# Patient Record
Sex: MALE | Race: WHITE
[De-identification: names, ages, dates, MRNs, and addresses within clinical notes are randomized per-mention and may not be internally consistent; named-entity substitution may affect disease eponyms.]

---

## 2019-07-08 ENCOUNTER — HOSPITAL ENCOUNTER (OUTPATIENT)
Age: 59
End: 2019-07-08
Payer: COMMERCIAL

## 2019-07-08 DIAGNOSIS — R10.9: Primary | ICD-10-CM

## 2019-07-08 LAB
ADD MANUAL DIFF / SLIDE REVIEW: NO
ALBUMIN SERPL-MCNC: 4.6 G/DL (ref 3.5–5)
ALBUMIN/GLOB SERPL: 1.6 {RATIO} (ref 1–2.8)
ALP SERPL-CCNC: 78 U/L (ref 38–126)
ALT SERPL-CCNC: 36 IU/L (ref 21–72)
BUN SERPL-MCNC: 24 MG/DL (ref 9–20)
CALCIUM SERPL-MCNC: 9.6 MG/DL (ref 8.4–10.2)
CHLORIDE SERPL-SCNC: 104 MMOL/L (ref 98–107)
CO2 SERPL-SCNC: 27 MMOL/L (ref 22–32)
ESTIMATED GLOMERULAR FILT RATE: > 60 ML/MIN (ref 60–?)
GLOBULIN SER CALC-MCNC: 2.9 G/DL (ref 1.7–4.1)
GLUCOSE SERPL-MCNC: 99 MG/DL (ref 70–100)
HEMATOCRIT: 42.3 % (ref 41–53)
HEMOGLOBIN: 14.4 G/DL (ref 13.5–17.5)
HEMOLYSIS: < 15 (ref 0–50)
LIPASE SERPL-CCNC: 268 U/L (ref 23–300)
LYMPHOCYTES # SPEC AUTO: 1200 /UL (ref 1100–4500)
MCV RBC: 89.2 FL (ref 80–100)
MEAN CORPUSCULAR HEMOGLOBIN: 30.3 PG (ref 26–34)
MEAN CORPUSCULAR HGB CONC: 34 % (ref 30–36)
PLATELET COUNT: 178 X10^3/UL (ref 150–400)
POTASSIUM SERPL-SCNC: 5 MMOL/L (ref 3.4–5.1)
PROT SERPL-MCNC: 7.5 G/DL (ref 6.3–8.2)
SODIUM SERPL-SCNC: 139 MMOL/L (ref 137–145)

## 2019-07-08 PROCEDURE — 86140 C-REACTIVE PROTEIN: CPT

## 2019-07-08 PROCEDURE — 85651 RBC SED RATE NONAUTOMATED: CPT

## 2019-07-08 PROCEDURE — 36415 COLL VENOUS BLD VENIPUNCTURE: CPT

## 2019-07-08 PROCEDURE — 85025 COMPLETE CBC W/AUTO DIFF WBC: CPT

## 2019-07-08 PROCEDURE — 74022 RADEX COMPL AQT ABD SERIES: CPT

## 2019-07-08 PROCEDURE — 83690 ASSAY OF LIPASE: CPT

## 2019-07-08 PROCEDURE — 80053 COMPREHEN METABOLIC PANEL: CPT

## 2019-07-08 NOTE — DI.RAD.S_ITS
PROCEDURE:  XR ACUTE ABDOMEN SERIES  
   
INDICATIONS:  abdominal pain  
   
TECHNIQUE:  One view chest and two views of the abdomen were acquired.    
   
COMPARISON:  None.  
   
FINDINGS:    
   
Surgical changes and devices:  None.    
   
Chest:  Lungs are clear.  Heart size is normal.  No pleural effusions.  No   
pneumoperitoneum.    
   
Abdomen:  Bowel gas pattern is normal.  Mild fecal stasis throughout the colon is seen.   
No suspicious calcifications.  Visualized solid organ contours appear normal.    
   
Bones:  No suspicious bony lesions.    
   
IMPRESSION: Mild constipation. No gross free air. No acute cardiopulmonary pathology.  
   
   
   
Dictated by: Bebeto Kothrai M.D. on 7/08/2019 at 17:05       
Approved by: Bebeto Kothari M.D. on 7/08/2019 at 17:05

## 2020-03-02 ENCOUNTER — HOSPITAL ENCOUNTER (OUTPATIENT)
Dept: HOSPITAL 73 - ED | Age: 60
Setting detail: OBSERVATION
Discharge: HOME | End: 2020-03-02
Payer: COMMERCIAL

## 2020-03-02 VITALS
DIASTOLIC BLOOD PRESSURE: 64 MMHG | SYSTOLIC BLOOD PRESSURE: 123 MMHG | OXYGEN SATURATION: 98 % | HEART RATE: 56 BPM | RESPIRATION RATE: 17 BRPM

## 2020-03-02 VITALS
RESPIRATION RATE: 18 BRPM | TEMPERATURE: 98 F | SYSTOLIC BLOOD PRESSURE: 181 MMHG | HEART RATE: 84 BPM | DIASTOLIC BLOOD PRESSURE: 98 MMHG | OXYGEN SATURATION: 98 %

## 2020-03-02 VITALS
DIASTOLIC BLOOD PRESSURE: 112 MMHG | OXYGEN SATURATION: 95 % | SYSTOLIC BLOOD PRESSURE: 159 MMHG | TEMPERATURE: 97.8 F | HEART RATE: 73 BPM | RESPIRATION RATE: 16 BRPM

## 2020-03-02 VITALS
TEMPERATURE: 97.52 F | OXYGEN SATURATION: 98 % | HEART RATE: 61 BPM | RESPIRATION RATE: 17 BRPM | DIASTOLIC BLOOD PRESSURE: 66 MMHG | SYSTOLIC BLOOD PRESSURE: 143 MMHG

## 2020-03-02 VITALS
RESPIRATION RATE: 16 BRPM | DIASTOLIC BLOOD PRESSURE: 76 MMHG | SYSTOLIC BLOOD PRESSURE: 153 MMHG | HEART RATE: 56 BPM | OXYGEN SATURATION: 98 %

## 2020-03-02 VITALS — BODY MASS INDEX: 29.5 KG/M2

## 2020-03-02 VITALS
HEART RATE: 59 BPM | DIASTOLIC BLOOD PRESSURE: 77 MMHG | RESPIRATION RATE: 16 BRPM | OXYGEN SATURATION: 97 % | SYSTOLIC BLOOD PRESSURE: 138 MMHG | TEMPERATURE: 98.2 F

## 2020-03-02 DIAGNOSIS — I10: ICD-10-CM

## 2020-03-02 DIAGNOSIS — G45.9: Primary | ICD-10-CM

## 2020-03-02 DIAGNOSIS — R20.0: ICD-10-CM

## 2020-03-02 DIAGNOSIS — G47.30: ICD-10-CM

## 2020-03-02 LAB
ADD MANUAL DIFF / SLIDE REVIEW: NO
BUN SERPL-MCNC: 21 MG/DL (ref 9–20)
CALCIUM SERPL-MCNC: 9.3 MG/DL (ref 8.4–10.2)
CHLORIDE SERPL-SCNC: 106 MMOL/L (ref 98–107)
CO2 SERPL-SCNC: 26 MMOL/L (ref 22–32)
ESTIMATED GLOMERULAR FILT RATE: > 60 ML/MIN (ref 60–?)
GLUCOSE SERPL-MCNC: 98 MG/DL (ref 80–110)
HEMATOCRIT: 40.2 % (ref 41–53)
HEMOGLOBIN: 14 G/DL (ref 13.5–17.5)
HEMOLYSIS: < 15 (ref 0–50)
INR PPP: 0.9 (ref 0.9–1.3)
LYMPHOCYTES # SPEC AUTO: 1400 /UL (ref 1100–4500)
MCV RBC: 86.2 FL (ref 80–100)
MEAN CORPUSCULAR HEMOGLOBIN: 30.1 PG (ref 26–34)
MEAN CORPUSCULAR HGB CONC: 34.9 % (ref 30–36)
PLATELET COUNT: 141 X10^3/UL (ref 150–400)
POTASSIUM SERPL-SCNC: 3.8 MMOL/L (ref 3.4–5.1)
PROTHROMBIN TIME: 10.9 SECONDS (ref 10.1–12.7)
PTT PARTIAL THROMBOPLASTIN TIM: 27 SECONDS (ref 26.4–36.2)
SODIUM SERPL-SCNC: 141 MMOL/L (ref 137–145)

## 2020-03-02 PROCEDURE — 93010 ELECTROCARDIOGRAM REPORT: CPT

## 2020-03-02 PROCEDURE — 99285 EMERGENCY DEPT VISIT HI MDM: CPT

## 2020-03-02 PROCEDURE — 70450 CT HEAD/BRAIN W/O DYE: CPT

## 2020-03-02 PROCEDURE — 85610 PROTHROMBIN TIME: CPT

## 2020-03-02 PROCEDURE — G0378 HOSPITAL OBSERVATION PER HR: HCPCS

## 2020-03-02 PROCEDURE — 93306 TTE W/DOPPLER COMPLETE: CPT

## 2020-03-02 PROCEDURE — 85730 THROMBOPLASTIN TIME PARTIAL: CPT

## 2020-03-02 PROCEDURE — 70548 MR ANGIOGRAPHY NECK W/DYE: CPT

## 2020-03-02 PROCEDURE — 93005 ELECTROCARDIOGRAM TRACING: CPT

## 2020-03-02 PROCEDURE — 96361 HYDRATE IV INFUSION ADD-ON: CPT

## 2020-03-02 PROCEDURE — 85025 COMPLETE CBC W/AUTO DIFF WBC: CPT

## 2020-03-02 PROCEDURE — 80048 BASIC METABOLIC PNL TOTAL CA: CPT

## 2020-03-02 PROCEDURE — A9579 GAD-BASE MR CONTRAST NOS,1ML: HCPCS

## 2020-03-02 PROCEDURE — 99235 HOSP IP/OBS SAME DATE MOD 70: CPT

## 2020-03-02 PROCEDURE — 96360 HYDRATION IV INFUSION INIT: CPT

## 2020-03-02 PROCEDURE — 36415 COLL VENOUS BLD VENIPUNCTURE: CPT

## 2020-03-02 PROCEDURE — 70553 MRI BRAIN STEM W/O & W/DYE: CPT

## 2020-03-02 PROCEDURE — 96372 THER/PROPH/DIAG INJ SC/IM: CPT

## 2020-03-02 RX ADMIN — ASPIRIN 324 MG: 81 TABLET, CHEWABLE ORAL at 06:28

## 2020-03-02 RX ADMIN — ENOXAPARIN SODIUM 40 MG: 40 INJECTION SUBCUTANEOUS at 09:35

## 2020-03-02 RX ADMIN — SODIUM CHLORIDE 150 ML: 0.9 INJECTION, SOLUTION INTRAVENOUS at 06:27

## 2020-03-02 NOTE — ED.NEUROSD
"HPI - Neuro Symptoms/Deficit
General
Chief Complaint: Neuro Symptoms/Deficit
Stated Complaint: right side numbness from neck through armm
Time Seen by Provider: 20 05:05
Source: patient
Mode of arrival: Ambulatory
Limitations: no limitations
History of Present Illness
HPI Narrative: 59-year-old male nonsmoker with history of hypertension presents with a chief complaint right arm numbness tingling and heaviness since  this morning.  He went to bed at about 5:00 p.m. and was in his normal state of health and 
woke up feeling this way, he thought that maybe he slept on wrong that it would just wear off.  He denies any blurred vision or trouble with speech but does state that he had some balance issues earlier.  He denies any history of the same.  He has 
had no headache and denies any traumatic injuries.
Onset (ago): hour(s)
Location: right arm
History of same: No
Severity: moderate
Quality: weak, numb and tingling
Relieving factors: none
Exacerbating factors: none
On Anticoagulants: No
Associated symptoms: denies other symptoms
Treatments Prior to Arrival: none
Related Data
Home Medications

 Medication  Instructions  Recorded  Confirmed
ibuprofen 200 mg PO Q6HP #0 01/25/13 10/24/19
MULTIVITAMIN 1 cap PO QDAY #0 02/21/13 10/24/19

Previous Rx's

 Medication  Instructions  Recorded
prednisone 20 mg tablet See Rx Instructions PO .COMPLEX #8 10/24/19
 tab 


Allergies

Allergy/AdvReac Type Severity Reaction Status Date / Time
No Known Allergies Allergy   Verified 20 05:22



Review of Systems
Constitutional
Constitutional: Denies chills, Denies fatigue, Denies fever(s), Denies frequent falls, Denies lethargy and Reports weakness
Eyes
Eyes: Denies change in vision, Denies eye discharge, Denies irritation and Denies loss of vision
ENT
Ears, Nose, Mouth, and Throat: Denies change in voice, Denies dizziness, Denies neck pain, Denies sore throat and Denies throat swelling
Cardiovascular
Cardiovascular: Denies chest pain, Denies irregular heart rhythm, Denies lightheadedness, Denies palpitations, Denies dyspnea, Denies dyspnea on exertion and Denies orthopnea
Respiratory
Respiratory: Denies cough, Denies dyspnea, Denies dyspnea on exertion and Denies wheezing
Gastrointestinal
Gastrointestinal: Denies abdominal pain, Denies change in bowel habits, Denies diarrhea, Denies nausea and Denies vomiting
Genitourinary
Genitourinary: Denies hematuria, Denies flank pain, Denies urinary incontinence and Denies urinary urgency
Musculoskeletal
Musculoskeletal: Denies back pain, Denies muscle weakness, Denies neck pain, Reports numbness and Denies tingling
Integumentary/Breasts
Skin/Breast: Denies pruritus, Denies erythema, Denies rash and Denies wounds
Neurologic
Neurologic: Denies behavioral changes, Denies confusion, Denies dizziness, Denies frequent falls, Denies loss of vision, Reports numbness, Denies tingling and Reports weakness
Psychiatric
Psychiatric: Denies anxiety, Denies behavioral changes, Denies confusion, Denies depression, Denies homicidal ideation and Denies suicidal ideation
Endocrine
Endocrine: Denies fatigue, Denies flushing and Denies palpitations
Hematologic/Lymphatic
Hematologic/Lymphatic: Denies easy bruising
Allergic/Immunologic
Allergic/Immunologic: Denies urticaria, Denies throat swelling and Denies wheezing

Patient History
Medical History (Reviewed 20 @ 06:06 by Nimesh Goddard DO)

H/O renal calculi (Inactive)
Hypertension (Chronic)
Sleep apnea, unspecified (Chronic 13)


Surgical History (Reviewed 20 @ 06:06 by Nimesh Goddard DO)

Status post appendectomy


Family History (Reviewed 20 @ 06:06 by Nimesh Goddard DO)
Father   
 Cancer
Grandmother   
 Heart disease
Mother   
 Cancer


Social History (Reviewed 20 @ 06:06 by Nimesh Goddard DO)
Smoking Status:  Former smoker 


Smoking Status: Former smoker
alcohol intake frequency: 0-2 drinks per day
Substa
[id number]|2020 08:44:42|2020 08:44:42|PM.HP.1||||"History of Present Illness

## 2020-03-02 NOTE — PC.NURSE
Evening note:
Duane back from MRI, denies numbness to extremities, only complains of left ear feeling congested. Tele back on. Patient is oriented x 3 and situation, visiting with friend at bedside.

## 2020-03-02 NOTE — PC.NURSE
Patient was admitted this morning for c/o right arm numbness and weakness which has apparently almost completely resolved when patient arrived to ER. Patient states his arm is no longer numb or weak (equal strength noted) but does feel  slightly 
sensitive  on area ner pinky on right hand. No other neurological symptoms noted, NIH remains at 0. Independent in room, voiding without problem. Eating and drinking without problem. Refused IVF  I don't need anymore saline, I am drinking water 
fine.  Telemetry noted at SB-SR 58-60's. Patient is eager for discharge to home. Plan for MRI this afternoon. Call light within reach.

## 2020-03-02 NOTE — DI.MRI.S_ITS
PROCEDURE:  MR STROKE  
Pre- and post-contrast brain MRI, non-contrast brain MR angiogram, pre- and postcontrast   
neck MR angiogram  
   
INDICATIONS:  stroke  
   
TECHNIQUE:    
Brain:  Noncontrast axial T1 spin echo, axial T2 fast spin echo, sagittal and axial   
FLAIR, coronal T2 fast spin echo, axial gradient echo, axial diffusion and ADC through   
the brain.  After the administration of contrast, axial 3D VIBE of the cranial   
vasculature and brain.    
Brain MRA:  Non-contrast 3-D time of flight MR angiogram, with multiple   
maximum-intensity-projection (MIP) reformats performed.    
Neck MRA:  Axial and sagittal TruFISP through the neck.  Coronal dynamic MR angiogram   
during administration of contrast in the arterial and venous phases, with 3-dimenstional   
maximum-intensity-projection (MIP) reformats constructed from subtraction images.    
   
COMPARISON:  [facility], CT, CT STROKE, 3/02/2020, 5:15.  
   
FINDINGS:    
Image quality:  Excellent.    
   
BRAIN:    
The ventricular system and cortical sulci demonstrate atrophy, consistent for the   
patient's stated age. There are areas of increased T2/FLAIR signal intensity within the   
periventricular and subcortical white matter.  There is no acute intra-or extra axial   
fluid collection. No acute hemorrhage, mass lesion or midline shift. Brainstem is   
unremarkable. There are no areas of restricted diffusion. Globes are symmetrical. Sinuses   
demonstrate minimal mild scattered pansinus mucosal thickening most prominent in the   
ethmoid air cells. Osseous structures are intact.  
   
BRAIN MR ANGIOGRAM:    
Anterior circulation:  Intracranial internal carotid arteries are normal in size and   
enhancement.  The flow within the paired anterior cerebral arteries is normal and   
symmetric.  The flow within the middle cerebral arteries is normal and symmetric.  The   
anterior communicating artery is seen.  No stenoses, occlusions, or aneurysms.    
Posterior circulation:  The visualized portions of the vertebral arteries demonstrate   
normal caliber, and join to form a normal appearing basilar artery.  The flow within the   
posterior cerebral arteries is normal and symmetric.  No stenoses, occlusions, or   
aneurysms.    
   
NECK MR ANGIOGRAM:    
The origins of the left and right common, internal and external carotid arteries   
demonstrate no areas of hemodynamically significant stenosis, vascular occlusion or   
aneurysmal dilation. Origins of the left and right vertebral arteries demonstrate no   
areas of hemodynamically significant stenosis, vascular occlusion or aneurysmal dilation.   
Aortic arch demonstrates conventional anatomy. Limited, visualized portions of the   
subclavian vasculature are unremarkable.  
   
IMPRESSION:    
   
1. No acute intracranial process.  
   
2. Minimal atrophy and chronic microvascular ischemic changes.  
   
3. No areas of hemodynamically significant stenosis, vascular occlusion or aneurysmal   
dilation within the anterior or posterior circulation.  
   
4. No areas of hemodynamically significant stenosis, vascular occlusion or aneurysmal   
dilation within the neck vasculature.  
   
   
Dictated by: Leana Fernandez M.D. on 3/02/2020 at 16:00       
Approved by: Leana Fernandez M.D. on 3/02/2020 at 16:06

## 2020-03-02 NOTE — DI.ECHO.S_ITS
"Echocardiogram Report  
+-----------------------------------------------------------------------------+  
:Name: WEBB, DUANE R Study Date: 2020 Height: 69 in :  
:[facility] MRN #: [id number] Weight: 200 lb :  
:Account #: [id number] Gender: Male BSA: 2.1 m2 :  
:: 1960 Age: 59 yrs BP: 123/64 mmHg:  
:Reason For Study: CVA/TIA :  
:Ordering Physician: Dr. Brooks :  
:Denita Performed By: Joann Velazquez :  
:Referring: MARYSOL DELGADO R :  
+-----------------------------------------------------------------------------+  
Interpretation Summary  
The left ventricle is normal in size and wall thickness.  
There are no focal wall motion abnormalities.  
Diastolic parameters suggest probable normal left ventricular diastolic  
function and normal filling pressures.  
The right ventricle is normal in size and function.  
The right ventricular systolic pressure is estimated to be at least 22 mmHg  
based on an estimated right atrial pressure of 3 mm Hg.  
No obvious right to left shunt by bubble study. However, image quality is  
suboptimal and a small passage of bubbles could not be ruled out by this  
study.  
There is mild to moderate mitral regurgitation.  
There is no prior echocardiogram noted for this patient.  
  
Procedure: A two-dimensional transthoracic echocardiogram with color flow  
and Doppler was performed. The study quality was technically adequate. There  
is no prior echocardiogram noted for this patient. The patient was in normal  
sinus rhythm during the exam.  
  
Left Ventricle: The left ventricle is normal in size and wall thickness. The  
ejection fraction is estimated to be 60-65%. The left ventricular ejection  
fraction is normal. There are no focal wall motion abnormalities. Diastolic  
parameters suggest probable normal left ventricular diastolic function and  
normal filling pressures.  
  
Right Ventricle: The right ventricle is normal in size and function.  
  
Atria: left atrium size is at upper limit of normal. Right atrial size is  
normal. There is no Doppler evidence for an interatrial shunt. No obvious  
right to left shunt by bubble study. However, image quality is suboptimal and  
a small passage of bubbles could not be ruled out by this study.  
  
Mitral Valve: The mitral valve leaflets appear mildly thickened, but open  
well. There is mild to moderate mitral regurgitation.  
  
Aortic Valve: The aortic valve is trileaflet. The aortic valve opens well.  
There is no aortic valve stenosis. No aortic regurgitation is present.  
  
Tricuspid Valve: The tricuspid valve is normal in structure and function.  
There is mild tricuspid regurgitation. The right ventricular systolic pressure  
is estimated to be at least 22 mmHg based on an estimated right atrial  
pressure of 3 mm Hg.  
  
Pulmonic Valve: The pulmonic valve is normal in structure and function.  
There is a trace or physiologic amount of pulmonic regurgitation.  
  
Great Vessels: The aortic root is normal size. The ascending aorta is at the  
upper limits of normal in size. The IVC is of normal diameter and collapses  
greater than 50% with a sniff. This suggests a low right atrial pressure of 3  
mm Hg.  
  
Pericardium/ Pleura There is no pericardial effusion. There is no pleural  
effusion.  
  
MMode/2D Measurements & Calculations  
LVIDd: 5.5 cm LVOT diam: 2.1 cm  
LVIDs: 3.2 cm Ao root diam: 3.6 cm  
FS: 40.9 % asc Aorta Diam: 3.4 cm  
EPSS: 0.84 cm Ao Arch Diam (Prox Trans): 2.6 cm  
IVSd: 0.81 cm  
LVPWd: 0.69 cm  
LV walker. diameter/BSA (cm/m^2): 2.6  
LV sys. diameter/BSA (cm/m^2): 1.6  
_______________________________________________________________  
LA A2 area: 23.7 cm2 RA long axis: 4.8 cm  
LA A4 area: 17.4 cm2 RA area: 16.8 cm2  
LA length (vol): 5.0 cm RA vol: 50.1 ml  
LA vol: 69.9 ml RA VI: 24.2 ml/m2  
LA vol index: 33.8 ml/m2 IVC diam: 2.1 cm  
_______________________________________________________________  
RVD1 (basal): 3.4 
[id number]|2020 05:16:00|2020 07:23:00|LATISHA.CT.S_ITS|[facility]|Imaging|[id number]|"PROCEDURE:  CT STROKE

## 2020-03-02 NOTE — PM.DS.1
"History of Present Illness
History of Present Illness
Date Patient Seen: 03/02/20
Time Patient Seen: 17:45
Chief complaint: right side numbness from neck through arm
Narrative: 
59-year-old male previously treated for hypertension admitted with a possible stroke

Patient awoke from sleep at about 03:30 noting that his whole right arm from the armpit down the arm into the fingers was numb.  He said he could of stuck a needle in it and would not have been something he could feel.  He figured he must have slept 
on it strangely and they would start to return to normal.  After 15-20 minutes he was not having any improvement did some research online discovered that this could be signs of a stroke.  He then continued to wonder what to do next got up took a 
shower still was not improving and eventually brought himself to the [facility] Emergency Department.  Somewhere in there as he was being evaluated in the emergency department his symptoms resolved completely.  They did not resolve in a pins 
and needles fashion but more of a just sort of fading away and he returned to normal

Denies any headache.  Denies any symptoms in his leg whatsoever although he felt like his balance may have been a bit affected somehow a nondescript fashion.  No difficulty with speech swallowing vision or hearing.

Did have a fair amount drink evening before went to bed early at 17:30 or 18:00, awakening at 03:30 as above

Discharge Providers
Provider
Date of admission: 03/02/20 06:35

Discharge Date: 03/02/20
Primary care physician: Todd Barger MD

Discharge provider: Todd Barger MD


Summary
Hospital Course
Discharge Diagnosis: 
1.  TIA

2. Hypertension

3. Sleep apnea 
Hospital Course: 
Patient presented to the hospital emergency department with right arm symptoms as described above.  His symptoms resolved completely.  Evaluation emergency department in follow-up evaluation with MR imaging failed to reveal any evidence of an acute 
CVA or any vascular issue with his inter cerebral vascular tree.

Blood pressure was okay

He was felt to benefit from an aspirin a day therapy at least in the short term as well as statin therapy.  Echocardiogram was performed but not available when patient was ready for discharge.  There is no dysrhythmias noted

Patient was felt to be ready for discharge given complete resolution of symptoms and lack of findings thus far.

Exam
Vital Signs
(past 8 hours):  -

 03/02/20
13:30 03/02/20
16:00
Temperature 98.2 F 97.5 F L
Pulse Rate 59 L 61
Respiratory Rate 16 17
Blood Pressure 138/77 143/66 H
Pulse Oximetry 97 98



Oxygen Delivery Method         Room Air
Oxygen Flow Rate               0




Objective
Labs

Result Diagrams: 
03/02/20 05:20          

03/02/20 05:20          

Labs:  Laboratory Results - last 24 hr

  03/02/20 03/02/20 03/02/20
  05:20 05:20 05:20
WBC  4.2 L  
RBC  4.66  
Hgb  14.0  
Hct  40.2 L  
MCV  86.2  
MCH  30.1  
MCHC  34.9  
RDW  14.6  
Plt Count  141 L  
Neut % (Auto)  51.9  
Lymph % (Auto)  31.9  
Mono % (Auto)  12.0  
Eos % (Auto)  3.0  
Baso % (Auto)  1.2  
Neut # (Auto)  2200  
Lymph # (Auto)  1400  
[place] # (Auto)  500  
Eos # (Auto)  100  
Baso # (Auto)  100  
PT   10.9 
INR   0.9 
APTT   27 
Sodium    141
Potassium    3.8
Chloride    106
Carbon Dioxide    26
BUN    21 H
Creatinine    1.20
Estimated GFR    > 60.0
BUN/Creatinine Ratio    17.5
Glucose    98
Calcium    9.3



Discharge Plan
Discharge Plan
Patient Disposition: Home

Discharge orders & Medications
Prescriptions:
New
  aspirin 325 mg tablet 
   325 mg PO DAILY Qty: 90 RF: 3
  atorvastatin 20 mg tablet 
   20 mg PO BEDTIME Qty: 30 RF: 5

Continued
  ibuprofen 200 MG tablet 
   200 mg PO Q6HP PRN (Reason: Pain (Scale Score 1-3)) Qty: 0 RF: 0
  MULTIVITAMIN   
   1 cap PO QDAY Qty: 0 RF: 0
  oxymetazoline [Afrin Sinus (oxymetazoline)] 0.05 % Spray,Non-Aerosol 
   2 spray INTRANASAL Q12H PRN (Reason
[id number]|2020-03-02 18:22:29|2020-03-02 18:22:29|PC.NURSE||||"DC note:

## 2020-03-02 NOTE — P.HP_ITS
"History of Present Illness    
History of Present Illness    
Date Patient Seen: 20    
Time Patient Seen: 08:44    
Chief complaint: right side numbness from neck through arm    
Narrative:     
59-year-old male previously treated for hypertension admitted with a possible   
stroke    
    
Patient awoke from sleep at about 03:30 noting that his whole right arm from the  
armpit down the arm into the fingers was numb.  He said he could of stuck a   
needle in it and would not have been something he could feel.  He figured he   
must have slept on it strangely and they would start to return to normal.  After  
15-20 minutes he was not having any improvement did some research online   
discovered that this could be signs of a stroke.  He then continued to wonder   
what to do next got up took a shower still was not improving and eventually   
brought himself to the [facility] Emergency Department.  Somewhere in there  
as he was being evaluated in the emergency department his symptoms resolved com  
pletely.  They did not resolve in a pins and needles fashion but more of a just   
sort of fading away and he returned to normal    
    
Denies any headache.  Denies any symptoms in his leg whatsoever although he felt  
like his balance may have been a bit affected somehow a nondescript fashion.  No  
difficulty with speech swallowing vision or hearing.    
    
Did have a fair amount drink evening before went to bed early at 17:30 or 18:00,  
awakening at 03:30 as above    
    
Patient History    
Medical History (Reviewed 20 @ 08:46 by Todd Barger MD)    
    
H/O renal calculi (Inactive)    
Hypertension (Chronic)    
Sleep apnea, unspecified (Chronic 13)    
    
    
Surgical History (Reviewed 20 @ 08:46 by Todd Barger MD)    
    
Status post appendectomy    
    
    
Family & Social History    
Family History (Reviewed 20 @ 08:46 by Todd Barger MD)    
Father      Cancer    
Grandmother      Heart disease    
Mother      Cancer    
    
    
Safety & Behavioral:                        
    
Feels Safe in Current            Yes    
Environment                          
    
    
Tobacco & Substance use:                    
    
Smoking Status                   Former smoker    
alcohol intake frequency         0-2 drinks per day    
Substance Use Type               does not use    
    
    
    
Meds    
Home Medications and Allergies    
                                Home Medications    
    
    
    
 Medication  Instructions  Recorded  Confirmed  Type    
     
ibuprofen 200 mg PO Q6HP PRN #0 13 History    
     
MULTIVITAMIN 1 cap PO QDAY #0 02/21/13 03/02/20 History    
     
aspirin 325 mg PO DAILY #90 tab 20  Rx    
     
atorvastatin 20 mg PO BEDTIME #30 tab 20  Rx    
     
oxymetazoline [Afrin Sinus 2 spray INTRANASAL Q12H PRN 20 History    
    
(oxymetazoline)]        
    
    
    
                                    Allergies    
    
    
    
Allergy/AdvReac Type Severity Reaction Status Date / Time    
     
No Known Allergies Allergy   Verified 20 05:22    
    
    
    
    
Review of Systems    
Constitutional    
Constitutional: Denies excessive sweating, Denies fever(s), Denies headache(s),   
Denies weakness, Denies weight gain and Denies weight loss    
Eyes    
Eyes: Denies change in vision, Denies itchy eyes, Denies loss of vision and   
Denies other visual disturbances    
ENT    
Ears, Nose, Mouth, and Throat: No change in voice, No difficulty swallowing, No   
dizziness, No ear pain, No headache(s), No hoarseness, No lip swelling, No neck   
pain, No sore throat, No throat swelling and No tongue swelling    
Cardiovascular    
Cardiovascular: Denies chest pain, Denies fainting, Denies fast heart rate,   
Denies irregular heart rhythm, Denies rapid, pounding, or irregul
[id number]|2020 17:45:00|2020 17:45:00|KOREY_ITS|[facility]|Health Information Management|[id number]|"History of Present Illness

## 2023-03-22 ENCOUNTER — HOSPITAL ENCOUNTER (OUTPATIENT)
Age: 63
End: 2023-03-22
Payer: COMMERCIAL

## 2023-03-22 VITALS — BODY MASS INDEX: 29.5 KG/M2

## 2023-03-22 DIAGNOSIS — I10: ICD-10-CM

## 2023-03-22 DIAGNOSIS — R51.9: ICD-10-CM

## 2023-03-22 DIAGNOSIS — G47.30: Primary | ICD-10-CM

## 2023-03-22 LAB
ADD MANUAL DIFF / SLIDE REVIEW: NO
ALBUMIN SERPL-MCNC: 4.6 G/DL (ref 3.5–5)
ALBUMIN/GLOB SERPL: 1.3 {RATIO} (ref 1–2.8)
ALP SERPL-CCNC: 75 U/L (ref 38–126)
ALT SERPL-CCNC: 70 IU/L (ref ?–50)
BUN SERPL-MCNC: 24 MG/DL (ref 9–20)
CALCIUM SERPL-MCNC: 9.6 MG/DL (ref 8.4–10.2)
CHLORIDE SERPL-SCNC: 104 MMOL/L (ref 98–107)
CHOLEST SERPL-MCNC: 275 MG/DL (ref 140–199)
CO2 SERPL-SCNC: 30 MMOL/L (ref 22–32)
ESTIMATED GLOMERULAR FILT RATE: 49 ML/MIN (ref 60–?)
GLOBULIN SER CALC-MCNC: 3.6 G/DL (ref 1.7–4.1)
GLUCOSE SERPL-MCNC: 92 MG/DL (ref 80–110)
HBA1C MFR BLD: 5 % (ref 4–6)
HDLC SERPL-MCNC: 63 MG/DL (ref 40–60)
HEMATOCRIT: 41.9 % (ref 41–53)
HEMOGLOBIN: 14.3 G/DL (ref 13.5–17.5)
HEMOLYSIS: < 15 (ref 0–50)
LYMPHOCYTES # SPEC AUTO: 1400 /UL (ref 1100–4500)
MCV RBC: 87.1 FL (ref 80–100)
MEAN CORPUSCULAR HEMOGLOBIN: 29.6 PG (ref 26–34)
MEAN CORPUSCULAR HGB CONC: 34 % (ref 30–36)
PLATELET COUNT: 153 X10^3/UL (ref 150–400)
POTASSIUM SERPL-SCNC: 4.6 MMOL/L (ref 3.4–5.1)
PROT SERPL-MCNC: 8.2 G/DL (ref 6.3–8.2)
SODIUM SERPL-SCNC: 141 MMOL/L (ref 137–145)
TRIGL SERPL-MCNC: 321 MG/DL (ref 35–150)

## 2023-03-22 PROCEDURE — 85025 COMPLETE CBC W/AUTO DIFF WBC: CPT

## 2023-03-22 PROCEDURE — 80061 LIPID PANEL: CPT

## 2023-03-22 PROCEDURE — 36415 COLL VENOUS BLD VENIPUNCTURE: CPT

## 2023-03-22 PROCEDURE — 83036 HEMOGLOBIN GLYCOSYLATED A1C: CPT

## 2023-03-22 PROCEDURE — 80053 COMPREHEN METABOLIC PANEL: CPT

## 2023-04-01 ENCOUNTER — HOSPITAL ENCOUNTER (OUTPATIENT)
Age: 63
End: 2023-04-01
Payer: COMMERCIAL

## 2023-04-01 VITALS — BODY MASS INDEX: 29.5 KG/M2

## 2023-04-01 DIAGNOSIS — R51.9: ICD-10-CM

## 2023-04-01 DIAGNOSIS — J32.8: Primary | ICD-10-CM

## 2023-04-01 DIAGNOSIS — I10: ICD-10-CM

## 2023-04-01 PROCEDURE — 70551 MRI BRAIN STEM W/O DYE: CPT

## 2023-07-20 ENCOUNTER — HOSPITAL ENCOUNTER (OUTPATIENT)
Age: 63
End: 2023-07-20
Payer: COMMERCIAL

## 2023-07-20 VITALS — BODY MASS INDEX: 29.5 KG/M2

## 2023-07-20 DIAGNOSIS — I10: ICD-10-CM

## 2023-07-20 DIAGNOSIS — E78.2: Primary | ICD-10-CM

## 2023-07-20 DIAGNOSIS — N18.30: ICD-10-CM

## 2023-07-20 LAB
BUN SERPL-MCNC: 21 MG/DL (ref 9–20)
CALCIUM SERPL-MCNC: 9.3 MG/DL (ref 8.4–10.2)
CHLORIDE SERPL-SCNC: 101 MMOL/L (ref 98–107)
CHOLEST SERPL-MCNC: 175 MG/DL (ref 140–199)
CO2 SERPL-SCNC: 27 MMOL/L (ref 22–32)
ESTIMATED GLOMERULAR FILT RATE: > 60 ML/MIN (ref 60–?)
GLUCOSE SERPL-MCNC: 83 MG/DL (ref 80–110)
HDLC SERPL-MCNC: 62 MG/DL (ref 40–60)
HEMOLYSIS: < 15 (ref 0–50)
POTASSIUM SERPL-SCNC: 4.6 MMOL/L (ref 3.4–5.1)
SODIUM SERPL-SCNC: 136 MMOL/L (ref 137–145)
TRIGL SERPL-MCNC: 100 MG/DL (ref 35–150)

## 2023-07-20 PROCEDURE — 80061 LIPID PANEL: CPT

## 2023-07-20 PROCEDURE — 36415 COLL VENOUS BLD VENIPUNCTURE: CPT

## 2023-07-20 PROCEDURE — 80048 BASIC METABOLIC PNL TOTAL CA: CPT

## 2023-07-20 PROCEDURE — 83036 HEMOGLOBIN GLYCOSYLATED A1C: CPT

## 2023-07-21 LAB
SODIUM UR-SCNC: 5.1 % (ref 4.8–5.6)
X LABCORP ESTIM. AVG GLU (EAG): 100 MG/DL

## 2024-03-11 ENCOUNTER — HOSPITAL ENCOUNTER (OUTPATIENT)
Age: 64
End: 2024-03-11
Payer: COMMERCIAL

## 2024-03-11 VITALS — BODY MASS INDEX: 29.5 KG/M2

## 2024-03-11 DIAGNOSIS — I10: ICD-10-CM

## 2024-03-11 DIAGNOSIS — Z11.59: ICD-10-CM

## 2024-03-11 DIAGNOSIS — E78.2: Primary | ICD-10-CM

## 2024-03-11 DIAGNOSIS — Z11.4: ICD-10-CM

## 2024-03-11 DIAGNOSIS — Z12.5: ICD-10-CM

## 2024-03-11 LAB
ALBUMIN SERPL-MCNC: 4.2 G/DL (ref 3.5–5)
ALBUMIN/GLOB SERPL: 1.6 {RATIO} (ref 1–2.8)
ALP SERPL-CCNC: 64 U/L (ref 38–126)
ALT SERPL-CCNC: 41 IU/L (ref ?–50)
BUN SERPL-MCNC: 26 MG/DL (ref 9–20)
CALCIUM SERPL-MCNC: 9.3 MG/DL (ref 8.4–10.2)
CHLORIDE SERPL-SCNC: 107 MMOL/L (ref 98–107)
CHOLEST SERPL-MCNC: 152 MG/DL (ref 140–199)
CO2 SERPL-SCNC: 27 MMOL/L (ref 22–32)
ESTIMATED GLOMERULAR FILT RATE: > 60 ML/MIN (ref 60–?)
GLOBULIN SER CALC-MCNC: 2.7 G/DL (ref 1.7–4.1)
GLUCOSE SERPL-MCNC: 71 MG/DL (ref 80–110)
HCV AB SERPL QL IA: NEGATIVE S/C
HDLC SERPL-MCNC: 64 MG/DL (ref 40–60)
HEMOLYSIS: < 15 (ref 0–50)
HIV 1+2 AB+HIV1 P24 AG SERPL QL IA: NEGATIVE
POTASSIUM SERPL-SCNC: 4.3 MMOL/L (ref 3.4–5.1)
PROT SERPL-MCNC: 6.9 G/DL (ref 6.3–8.2)
SODIUM SERPL-SCNC: 140 MMOL/L (ref 137–145)
TRIGL SERPL-MCNC: 90 MG/DL (ref 35–150)

## 2024-03-11 PROCEDURE — 80053 COMPREHEN METABOLIC PANEL: CPT

## 2024-03-11 PROCEDURE — 87389 HIV-1 AG W/HIV-1&-2 AB AG IA: CPT

## 2024-03-11 PROCEDURE — 86803 HEPATITIS C AB TEST: CPT

## 2024-03-11 PROCEDURE — 80061 LIPID PANEL: CPT

## 2024-03-11 PROCEDURE — 36415 COLL VENOUS BLD VENIPUNCTURE: CPT

## 2025-03-17 ENCOUNTER — HOSPITAL ENCOUNTER (OUTPATIENT)
Age: 65
End: 2025-03-17
Payer: COMMERCIAL

## 2025-03-17 VITALS — BODY MASS INDEX: 29.5 KG/M2

## 2025-03-17 DIAGNOSIS — E78.2: ICD-10-CM

## 2025-03-17 DIAGNOSIS — Z12.5: ICD-10-CM

## 2025-03-17 DIAGNOSIS — Z00.00: Primary | ICD-10-CM

## 2025-03-17 DIAGNOSIS — N28.9: ICD-10-CM

## 2025-03-17 DIAGNOSIS — I10: ICD-10-CM

## 2025-03-17 LAB
ALBUMIN SERPL-MCNC: 4.8 G/DL (ref 3.5–5)
ALBUMIN/GLOB SERPL: 1.6 {RATIO} (ref 1–2.8)
ALP SERPL-CCNC: 68 U/L (ref 38–126)
ALT SERPL-CCNC: 33 IU/L (ref ?–50)
BUN SERPL-MCNC: 25 MG/DL (ref 9–20)
CALCIUM SERPL-MCNC: 10.1 MG/DL (ref 8.4–10.2)
CHLORIDE SERPL-SCNC: 102 MMOL/L (ref 98–107)
CHOLEST SERPL-MCNC: 251 MG/DL (ref 140–199)
CO2 SERPL-SCNC: 28 MMOL/L (ref 22–32)
ESTIMATED GLOMERULAR FILT RATE: > 60 ML/MIN (ref 60–?)
GLOBULIN SER CALC-MCNC: 3 G/DL (ref 1.7–4.1)
GLUCOSE SERPL-MCNC: 100 MG/DL (ref 80–110)
HDLC SERPL-MCNC: 77 MG/DL (ref 40–60)
HEMOLYSIS: < 15 (ref 0–50)
MICROALBUMI CREATININ RATIO UR: (no result) UG/MG CR (ref ?–30)
POTASSIUM SERPL-SCNC: 5.1 MMOL/L (ref 3.4–5.1)
PROT SERPL-MCNC: 7.8 G/DL (ref 6.3–8.2)
SODIUM SERPL-SCNC: 138 MMOL/L (ref 137–145)
TRIGL SERPL-MCNC: 143 MG/DL (ref 35–150)

## 2025-03-17 PROCEDURE — 82043 UR ALBUMIN QUANTITATIVE: CPT

## 2025-03-17 PROCEDURE — 80061 LIPID PANEL: CPT

## 2025-03-17 PROCEDURE — 36415 COLL VENOUS BLD VENIPUNCTURE: CPT

## 2025-03-17 PROCEDURE — 80053 COMPREHEN METABOLIC PANEL: CPT

## 2025-03-17 PROCEDURE — 82570 ASSAY OF URINE CREATININE: CPT

## 2025-04-03 ENCOUNTER — HOSPITAL ENCOUNTER (OUTPATIENT)
Age: 65
End: 2025-04-03
Payer: COMMERCIAL

## 2025-04-03 VITALS — BODY MASS INDEX: 29.5 KG/M2

## 2025-04-03 DIAGNOSIS — I10: ICD-10-CM

## 2025-04-03 DIAGNOSIS — Z13.6: Primary | ICD-10-CM

## 2025-04-03 DIAGNOSIS — E78.2: ICD-10-CM

## 2025-04-03 DIAGNOSIS — N28.9: ICD-10-CM

## 2025-04-03 PROCEDURE — 76706 US ABDL AORTA SCREEN AAA: CPT
